# Patient Record
Sex: FEMALE | Race: WHITE | NOT HISPANIC OR LATINO | URBAN - METROPOLITAN AREA
[De-identification: names, ages, dates, MRNs, and addresses within clinical notes are randomized per-mention and may not be internally consistent; named-entity substitution may affect disease eponyms.]

---

## 2024-09-12 VITALS
WEIGHT: 169.98 LBS | HEIGHT: 64 IN | TEMPERATURE: 97 F | SYSTOLIC BLOOD PRESSURE: 122 MMHG | DIASTOLIC BLOOD PRESSURE: 74 MMHG | OXYGEN SATURATION: 99 % | RESPIRATION RATE: 16 BRPM | HEART RATE: 61 BPM

## 2024-09-12 LAB
ANION GAP SERPL CALC-SCNC: 10 MMOL/L — SIGNIFICANT CHANGE UP (ref 5–17)
BUN SERPL-MCNC: 22 MG/DL — SIGNIFICANT CHANGE UP (ref 7–23)
CALCIUM SERPL-MCNC: 9.4 MG/DL — SIGNIFICANT CHANGE UP (ref 8.4–10.5)
CHLORIDE SERPL-SCNC: 104 MMOL/L — SIGNIFICANT CHANGE UP (ref 96–108)
CO2 SERPL-SCNC: 26 MMOL/L — SIGNIFICANT CHANGE UP (ref 22–31)
CREAT SERPL-MCNC: 0.99 MG/DL — SIGNIFICANT CHANGE UP (ref 0.5–1.3)
EGFR: 58 ML/MIN/1.73M2 — LOW
GLUCOSE SERPL-MCNC: 104 MG/DL — HIGH (ref 70–99)
HCT VFR BLD CALC: 38.8 % — SIGNIFICANT CHANGE UP (ref 34.5–45)
HGB BLD-MCNC: 12.4 G/DL — SIGNIFICANT CHANGE UP (ref 11.5–15.5)
MCHC RBC-ENTMCNC: 29.2 PG — SIGNIFICANT CHANGE UP (ref 27–34)
MCHC RBC-ENTMCNC: 32 GM/DL — SIGNIFICANT CHANGE UP (ref 32–36)
MCV RBC AUTO: 91.5 FL — SIGNIFICANT CHANGE UP (ref 80–100)
NRBC # BLD: 0 /100 WBCS — SIGNIFICANT CHANGE UP (ref 0–0)
PLATELET # BLD AUTO: 340 K/UL — SIGNIFICANT CHANGE UP (ref 150–400)
POTASSIUM SERPL-MCNC: 3.9 MMOL/L — SIGNIFICANT CHANGE UP (ref 3.5–5.3)
POTASSIUM SERPL-SCNC: 3.9 MMOL/L — SIGNIFICANT CHANGE UP (ref 3.5–5.3)
RBC # BLD: 4.24 M/UL — SIGNIFICANT CHANGE UP (ref 3.8–5.2)
RBC # FLD: 14.4 % — SIGNIFICANT CHANGE UP (ref 10.3–14.5)
SODIUM SERPL-SCNC: 140 MMOL/L — SIGNIFICANT CHANGE UP (ref 135–145)
WBC # BLD: 6.34 K/UL — SIGNIFICANT CHANGE UP (ref 3.8–10.5)
WBC # FLD AUTO: 6.34 K/UL — SIGNIFICANT CHANGE UP (ref 3.8–10.5)

## 2024-09-12 PROCEDURE — 99285 EMERGENCY DEPT VISIT HI MDM: CPT

## 2024-09-12 PROCEDURE — 71045 X-RAY EXAM CHEST 1 VIEW: CPT | Mod: 26

## 2024-09-12 PROCEDURE — 99053 MED SERV 10PM-8AM 24 HR FAC: CPT

## 2024-09-12 RX ORDER — SODIUM CHLORIDE 9 MG/ML
500 INJECTION INTRAMUSCULAR; INTRAVENOUS; SUBCUTANEOUS ONCE
Refills: 0 | Status: COMPLETED | OUTPATIENT
Start: 2024-09-12 | End: 2024-09-12
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
## 2024-09-12 NOTE — ED ADULT NURSE NOTE - NSFALLRISKINTERV_ED_ALL_ED
Assistance OOB with selected safe patient handling equipment if applicable/Assistance with ambulation/Communicate fall risk and risk factors to all staff, patient, and family/Monitor gait and stability/Monitor for mental status changes and reorient to person, place, and time, as needed/Provide visual cue: yellow wristband, yellow gown, etc/Reinforce activity limits and safety measures with patient and family/Toileting schedule using arm’s reach rule for commode and bathroom/Use of alarms - bed, stretcher, chair and/or video monitoring/Call bell, personal items and telephone in reach/Instruct patient to call for assistance before getting out of bed/chair/stretcher/Non-slip footwear applied when patient is off stretcher/[place] to call system/Physically safe environment - no spills, clutter or unnecessary equipment/Purposeful Proactive Rounding/Room/bathroom lighting operational, light cord in reach
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
## 2024-09-12 NOTE — ED ADULT TRIAGE NOTE - BEFAST ARM NUMBNESS
Urology Urology Anesthesia Hospitalist Urology Urology Urology Urology Hospitalist Hospitalist Hospitalist No
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
## 2024-09-13 ENCOUNTER — INPATIENT (INPATIENT)
Facility: HOSPITAL | Age: 79
LOS: 3 days | Discharge: HOME | End: 2024-09-17
Attending: INTERNAL MEDICINE | Admitting: INTERNAL MEDICINE
Payer: SELF-PAY

## 2024-09-13 DIAGNOSIS — R33.9 RETENTION OF URINE, UNSPECIFIED: ICD-10-CM

## 2024-09-13 DIAGNOSIS — I25.10 ATHEROSCLEROTIC HEART DISEASE OF NATIVE CORONARY ARTERY WITHOUT ANGINA PECTORIS: ICD-10-CM

## 2024-09-13 DIAGNOSIS — G93.41 METABOLIC ENCEPHALOPATHY: ICD-10-CM

## 2024-09-13 DIAGNOSIS — G30.9 ALZHEIMER'S DISEASE, UNSPECIFIED: ICD-10-CM

## 2024-09-13 DIAGNOSIS — Z29.9 ENCOUNTER FOR PROPHYLACTIC MEASURES, UNSPECIFIED: ICD-10-CM

## 2024-09-13 DIAGNOSIS — E03.9 HYPOTHYROIDISM, UNSPECIFIED: ICD-10-CM

## 2024-09-13 DIAGNOSIS — M54.30 SCIATICA, UNSPECIFIED SIDE: ICD-10-CM

## 2024-09-13 DIAGNOSIS — E78.5 HYPERLIPIDEMIA, UNSPECIFIED: ICD-10-CM

## 2024-09-13 LAB
ANION GAP SERPL CALC-SCNC: 8 MMOL/L — SIGNIFICANT CHANGE UP (ref 5–17)
APPEARANCE UR: CLEAR — SIGNIFICANT CHANGE UP
BACTERIA # UR AUTO: NEGATIVE /HPF — SIGNIFICANT CHANGE UP
BASOPHILS # BLD AUTO: 0.03 K/UL — SIGNIFICANT CHANGE UP (ref 0–0.2)
BASOPHILS NFR BLD AUTO: 0.6 % — SIGNIFICANT CHANGE UP (ref 0–2)
BILIRUB UR-MCNC: NEGATIVE — SIGNIFICANT CHANGE UP
BUN SERPL-MCNC: 17 MG/DL — SIGNIFICANT CHANGE UP (ref 7–23)
CALCIUM SERPL-MCNC: 8.9 MG/DL — SIGNIFICANT CHANGE UP (ref 8.4–10.5)
CHLORIDE SERPL-SCNC: 109 MMOL/L — HIGH (ref 96–108)
CO2 SERPL-SCNC: 28 MMOL/L — SIGNIFICANT CHANGE UP (ref 22–31)
COLOR SPEC: YELLOW — SIGNIFICANT CHANGE UP
CREAT SERPL-MCNC: 0.99 MG/DL — SIGNIFICANT CHANGE UP (ref 0.5–1.3)
DIFF PNL FLD: NEGATIVE — SIGNIFICANT CHANGE UP
EGFR: 58 ML/MIN/1.73M2 — LOW
EOSINOPHIL # BLD AUTO: 0.06 K/UL — SIGNIFICANT CHANGE UP (ref 0–0.5)
EOSINOPHIL NFR BLD AUTO: 1.2 % — SIGNIFICANT CHANGE UP (ref 0–6)
FOLATE SERPL-MCNC: 14.9 NG/ML — SIGNIFICANT CHANGE UP
GLUCOSE SERPL-MCNC: 89 MG/DL — SIGNIFICANT CHANGE UP (ref 70–99)
GLUCOSE UR QL: NEGATIVE MG/DL — SIGNIFICANT CHANGE UP
HCT VFR BLD CALC: 34.9 % — SIGNIFICANT CHANGE UP (ref 34.5–45)
HGB BLD-MCNC: 11.2 G/DL — LOW (ref 11.5–15.5)
IMM GRANULOCYTES NFR BLD AUTO: 0.4 % — SIGNIFICANT CHANGE UP (ref 0–0.9)
KETONES UR-MCNC: NEGATIVE MG/DL — SIGNIFICANT CHANGE UP
LEUKOCYTE ESTERASE UR-ACNC: ABNORMAL
LYMPHOCYTES # BLD AUTO: 1.36 K/UL — SIGNIFICANT CHANGE UP (ref 1–3.3)
LYMPHOCYTES # BLD AUTO: 26.7 % — SIGNIFICANT CHANGE UP (ref 13–44)
MAGNESIUM SERPL-MCNC: 2.4 MG/DL — SIGNIFICANT CHANGE UP (ref 1.6–2.6)
MCHC RBC-ENTMCNC: 29.2 PG — SIGNIFICANT CHANGE UP (ref 27–34)
MCHC RBC-ENTMCNC: 32.1 GM/DL — SIGNIFICANT CHANGE UP (ref 32–36)
MCV RBC AUTO: 91.1 FL — SIGNIFICANT CHANGE UP (ref 80–100)
MONOCYTES # BLD AUTO: 0.57 K/UL — SIGNIFICANT CHANGE UP (ref 0–0.9)
MONOCYTES NFR BLD AUTO: 11.2 % — SIGNIFICANT CHANGE UP (ref 2–14)
NEUTROPHILS # BLD AUTO: 3.06 K/UL — SIGNIFICANT CHANGE UP (ref 1.8–7.4)
NEUTROPHILS NFR BLD AUTO: 59.9 % — SIGNIFICANT CHANGE UP (ref 43–77)
NITRITE UR-MCNC: NEGATIVE — SIGNIFICANT CHANGE UP
NRBC # BLD: 0 /100 WBCS — SIGNIFICANT CHANGE UP (ref 0–0)
PH UR: 5.5 — SIGNIFICANT CHANGE UP (ref 5–8)
PHOSPHATE SERPL-MCNC: 3.9 MG/DL — SIGNIFICANT CHANGE UP (ref 2.5–4.5)
PLATELET # BLD AUTO: 249 K/UL — SIGNIFICANT CHANGE UP (ref 150–400)
POTASSIUM SERPL-MCNC: 3.9 MMOL/L — SIGNIFICANT CHANGE UP (ref 3.5–5.3)
POTASSIUM SERPL-SCNC: 3.9 MMOL/L — SIGNIFICANT CHANGE UP (ref 3.5–5.3)
PROT UR-MCNC: NEGATIVE MG/DL — SIGNIFICANT CHANGE UP
RAPID RVP RESULT: SIGNIFICANT CHANGE UP
RBC # BLD: 3.83 M/UL — SIGNIFICANT CHANGE UP (ref 3.8–5.2)
RBC # FLD: 14.3 % — SIGNIFICANT CHANGE UP (ref 10.3–14.5)
RBC CASTS # UR COMP ASSIST: 1 /HPF — SIGNIFICANT CHANGE UP (ref 0–4)
SARS-COV-2 RNA SPEC QL NAA+PROBE: SIGNIFICANT CHANGE UP
SODIUM SERPL-SCNC: 145 MMOL/L — SIGNIFICANT CHANGE UP (ref 135–145)
SP GR SPEC: 1.01 — SIGNIFICANT CHANGE UP (ref 1–1.03)
SQUAMOUS # UR AUTO: 5 /HPF — SIGNIFICANT CHANGE UP (ref 0–5)
T PALLIDUM AB TITR SER: NEGATIVE — SIGNIFICANT CHANGE UP
TSH SERPL-MCNC: 2.86 UIU/ML — SIGNIFICANT CHANGE UP (ref 0.27–4.2)
UROBILINOGEN FLD QL: 0.2 MG/DL — SIGNIFICANT CHANGE UP (ref 0.2–1)
VIT B12 SERPL-MCNC: 665 PG/ML — SIGNIFICANT CHANGE UP (ref 232–1245)
WBC # BLD: 5.1 K/UL — SIGNIFICANT CHANGE UP (ref 3.8–10.5)
WBC # FLD AUTO: 5.1 K/UL — SIGNIFICANT CHANGE UP (ref 3.8–10.5)
WBC UR QL: 18 /HPF — HIGH (ref 0–5)

## 2024-09-13 PROCEDURE — 99236 HOSP IP/OBS SAME DATE HI 85: CPT

## 2024-09-13 PROCEDURE — 93010 ELECTROCARDIOGRAM REPORT: CPT

## 2024-09-13 PROCEDURE — 99223 1ST HOSP IP/OBS HIGH 75: CPT

## 2024-09-13 PROCEDURE — 70450 CT HEAD/BRAIN W/O DYE: CPT | Mod: 26,MC

## 2024-09-13 RX ORDER — ACETAMINOPHEN 325 MG/1
650 TABLET ORAL EVERY 6 HOURS
Refills: 0 | Status: DISCONTINUED | OUTPATIENT
Start: 2024-09-13 | End: 2024-09-17

## 2024-09-13 RX ORDER — CARIPRAZINE 4.5 MG/1
1 CAPSULE, GELATIN COATED ORAL
Refills: 0 | DISCHARGE

## 2024-09-13 RX ORDER — OLANZAPINE 7.5 MG/1
5 TABLET ORAL ONCE
Refills: 0 | Status: COMPLETED | OUTPATIENT
Start: 2024-09-13 | End: 2024-09-13

## 2024-09-13 RX ORDER — RISPERIDONE 0.25 MG/1
0.25 TABLET, FILM COATED ORAL EVERY 24 HOURS
Refills: 0 | Status: DISCONTINUED | OUTPATIENT
Start: 2024-09-13 | End: 2024-09-13

## 2024-09-13 RX ORDER — RISPERIDONE 0.25 MG/1
0.5 TABLET, FILM COATED ORAL AT BEDTIME
Refills: 0 | Status: DISCONTINUED | OUTPATIENT
Start: 2024-09-13 | End: 2024-09-13

## 2024-09-13 RX ORDER — MEMANTINE 7 MG/1
10 CAPSULE, EXTENDED RELEASE ORAL EVERY 12 HOURS
Refills: 0 | Status: DISCONTINUED | OUTPATIENT
Start: 2024-09-13 | End: 2024-09-16

## 2024-09-13 RX ORDER — TRAZODONE HCL 50 MG
100 TABLET ORAL ONCE
Refills: 0 | Status: COMPLETED | OUTPATIENT
Start: 2024-09-13 | End: 2024-09-13

## 2024-09-13 RX ORDER — FUROSEMIDE 40 MG
1 TABLET ORAL
Refills: 0 | DISCHARGE

## 2024-09-13 RX ORDER — POLYETHYLENE GLYCOL 3350 17 G/17G
17 POWDER, FOR SOLUTION ORAL EVERY 12 HOURS
Refills: 0 | Status: DISCONTINUED | OUTPATIENT
Start: 2024-09-13 | End: 2024-09-17

## 2024-09-13 RX ORDER — RIVASTIGMINE TARTRATE 1.5 MG/1
1 CAPSULE ORAL
Refills: 0 | DISCHARGE

## 2024-09-13 RX ORDER — OLANZAPINE 7.5 MG/1
5 TABLET ORAL ONCE
Refills: 0 | Status: DISCONTINUED | OUTPATIENT
Start: 2024-09-13 | End: 2024-09-13

## 2024-09-13 RX ORDER — ONDANSETRON 2 MG/ML
4 INJECTION, SOLUTION INTRAMUSCULAR; INTRAVENOUS EVERY 8 HOURS
Refills: 0 | Status: DISCONTINUED | OUTPATIENT
Start: 2024-09-13 | End: 2024-09-17

## 2024-09-13 RX ORDER — SENNA 187 MG
2 TABLET ORAL
Refills: 0 | Status: DISCONTINUED | OUTPATIENT
Start: 2024-09-13 | End: 2024-09-17

## 2024-09-13 RX ORDER — MEMANTINE 7 MG/1
10 CAPSULE, EXTENDED RELEASE ORAL DAILY
Refills: 0 | Status: DISCONTINUED | OUTPATIENT
Start: 2024-09-13 | End: 2024-09-13

## 2024-09-13 RX ORDER — OLANZAPINE 7.5 MG/1
2.5 TABLET ORAL
Qty: 0 | Refills: 0 | DISCHARGE
Start: 2024-09-13

## 2024-09-13 RX ORDER — ENOXAPARIN SODIUM 100 MG/ML
40 INJECTION SUBCUTANEOUS EVERY 24 HOURS
Refills: 0 | Status: DISCONTINUED | OUTPATIENT
Start: 2024-09-13 | End: 2024-09-17

## 2024-09-13 RX ORDER — RIVASTIGMINE TARTRATE 1.5 MG/1
1 CAPSULE ORAL EVERY 24 HOURS
Refills: 0 | Status: DISCONTINUED | OUTPATIENT
Start: 2024-09-13 | End: 2024-09-17

## 2024-09-13 RX ORDER — RISPERIDONE 0.25 MG/1
0.25 TABLET, FILM COATED ORAL ONCE
Refills: 0 | Status: COMPLETED | OUTPATIENT
Start: 2024-09-13 | End: 2024-09-13

## 2024-09-13 RX ORDER — OLANZAPINE 7.5 MG/1
2.5 TABLET ORAL EVERY 8 HOURS
Refills: 0 | Status: DISCONTINUED | OUTPATIENT
Start: 2024-09-13 | End: 2024-09-13

## 2024-09-13 RX ORDER — RISPERIDONE 0.25 MG/1
1 TABLET, FILM COATED ORAL
Qty: 0 | Refills: 0 | DISCHARGE
Start: 2024-09-13

## 2024-09-13 RX ORDER — OLANZAPINE 7.5 MG/1
2.5 TABLET ORAL EVERY 8 HOURS
Refills: 0 | Status: DISCONTINUED | OUTPATIENT
Start: 2024-09-13 | End: 2024-09-17

## 2024-09-13 RX ORDER — TRAZODONE HCL 50 MG
50 TABLET ORAL AT BEDTIME
Refills: 0 | Status: DISCONTINUED | OUTPATIENT
Start: 2024-09-13 | End: 2024-09-17

## 2024-09-13 RX ADMIN — ACETAMINOPHEN 650 MILLIGRAM(S): 325 TABLET ORAL at 23:16

## 2024-09-13 RX ADMIN — MEMANTINE 10 MILLIGRAM(S): 7 CAPSULE, EXTENDED RELEASE ORAL at 22:07

## 2024-09-13 RX ADMIN — ACETAMINOPHEN 650 MILLIGRAM(S): 325 TABLET ORAL at 22:16

## 2024-09-13 RX ADMIN — SODIUM CHLORIDE 500 MILLILITER(S): 9 INJECTION INTRAMUSCULAR; INTRAVENOUS; SUBCUTANEOUS at 00:04

## 2024-09-13 RX ADMIN — MEMANTINE 10 MILLIGRAM(S): 7 CAPSULE, EXTENDED RELEASE ORAL at 11:22

## 2024-09-13 RX ADMIN — POLYETHYLENE GLYCOL 3350 17 GRAM(S): 17 POWDER, FOR SOLUTION ORAL at 22:08

## 2024-09-13 RX ADMIN — Medication 10 MILLIGRAM(S): at 11:23

## 2024-09-13 RX ADMIN — Medication 100 MILLIGRAM(S): at 11:22

## 2024-09-13 RX ADMIN — RISPERIDONE 0.25 MILLIGRAM(S): 0.25 TABLET, FILM COATED ORAL at 06:47

## 2024-09-13 RX ADMIN — Medication 50 MILLIGRAM(S): at 22:07

## 2024-09-13 RX ADMIN — POLYETHYLENE GLYCOL 3350 17 GRAM(S): 17 POWDER, FOR SOLUTION ORAL at 11:22

## 2024-09-13 RX ADMIN — Medication 2 TABLET(S): at 11:22

## 2024-09-13 RX ADMIN — ENOXAPARIN SODIUM 40 MILLIGRAM(S): 100 INJECTION SUBCUTANEOUS at 11:22

## 2024-09-13 RX ADMIN — OLANZAPINE 5 MILLIGRAM(S): 7.5 TABLET ORAL at 02:50

## 2024-09-13 RX ADMIN — Medication 2 TABLET(S): at 22:07

## 2024-09-13 NOTE — H&P ADULT - PROBLEM SELECTOR PLAN 6
F: s/p 500cc NS in ED  E: replete prn  D: pending bedside dysphagia  GI: none  DVT: lovenox  Code: DNR/DNI per outpt records - call NH/ to confirm in AM  Dispo: [facility] F: s/p 500cc NS in ED  E: replete prn  D: pending bedside dysphagia  GI: none  DVT: lovenox  Code: DNR/DNI    Dispo: [facility]

## 2024-09-13 NOTE — H&P ADULT - PROBLEM SELECTOR PLAN 4
Per chart review, pt w hx of hypothyroidism, but does not appear to currently be prescribed any meds  - f/u TSH, free T4  - formal med rec # HLD  Per outpt chart review, pt noted to have hx of CAD, not documented whether pt has history of MI, stents, etc.   - c/w atorvastatin 20mg po qhs # HLD  Per outpt chart review, pt noted to have hx of CAD, not documented whether pt has history of MI, stents, etc.   - c/w atorvastatin 20mg po qhs  - home med: lasix 20mg daily (on forms sent from NH, formal med rec in AM)
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
## 2024-09-13 NOTE — H&P ADULT - ASSESSMENT
78 yo F w PMH of dementia, recurrent UTIs, intermittent urinary retention, BIBEMS from NH for behavior issues, aggression. 80 yo F w PMH of dementia, recurrent UTIs, intermittent urinary retention, BIBEMS from NH for behavior issues, aggression, admitted for metabolic encephalpathy abd behavioral disturbance w/u.  78 yo F w PMH of dementia, recurrent UTIs, intermittent urinary retention, BIBEMS from NH for behavior issues, aggression, admitted for metabolic encephalopathy abd behavioral disturbance w/u.

## 2024-09-13 NOTE — H&P ADULT - PROBLEM SELECTOR PLAN 2
AAOx1 at baseline  - see above Home AAOx1 at baseline  - c/w home memantine 28mg po daily  - c/w home rivastigmine 9.5mg/24h patch daily  - see above AAOx1 at baseline  - c/w home memantine 10mg po daily  - see above

## 2024-09-13 NOTE — BH CONSULTATION LIAISON ASSESSMENT NOTE - HPI (INCLUDE ILLNESS QUALITY, SEVERITY, DURATION, TIMING, CONTEXT, MODIFYING FACTORS, ASSOCIATED SIGNS AND SYMPTOMS)
The patient is a 79 year old female with PMH of Alzheimer's disease, recurrent UTIs, intermittent urinary retention, fibromyalgia, hypothyroidism, CAD who presented to the emergency room for worsening behavioral disturbance at assisted living facility. The patient has psychiatric history of major neurocognitive disorder due to alzheimer's disease with behavioral disturbance with no past psychiatric hospitalizations. The patient was found to have retained 800 cc of urine in the emergency room and was subsequently admitted for further evaluation. Her urinalysis returned with  The patient is a 79 year old female with PMH of Alzheimer's disease, recurrent UTIs, intermittent urinary retention, fibromyalgia, hypothyroidism, CAD who presented to the emergency room for worsening behavioral disturbance at assisted living facility. The patient has psychiatric history of major neurocognitive disorder due to alzheimer's disease with behavioral disturbance with no past psychiatric hospitalizations. The patient was found to have retained 800 cc of urine in the emergency room and was subsequently admitted for further evaluation. She required zyprexa 5 mg IM q once in the ER due to agitated behavior. Her urinalysis returned with wbc 18 and small leukocyte esterase and she was initiated on ceftriaxone 1000 mg IV q24 hours for three days to treat UTI. Psychiatry was consulted for medication management. The patient's  was at bedside, who confirmed medication regimen from psychiatrist at the assisted living facility of vraylar 1.5 mg po daily, trazodone 50 mg po qhs, memantine ER 28 mg po daily, and rivastigmine 9.5 mg transdermal patch per 24 hours. The patient's  endorses the patient has behavioral disturbances when she has a urinary tract infection, however otherwise her behavior is well controlled with the aforementioned medication regimen. He endorses she moved into the assisted living facility in march/april of 2024 due to behavioral disturbances at home in the context of her alzheimer's disease and was subsequently initiated on the current medication regimen. Throughout evaluation, the patient is minimally response however pleasant. She is AA0X1 (person, not place or time) which is her baseline. She has been behaviorally controlled overnight post receiving the intramuscular zyprexa. She denies depressive symptoms and no manic or psychotic symptoms appear throughout evaluation. She denies suicidal or homicidal ideation, intent, or plan. No perceptual disturbances elicited.  The patient is a 79 year old female with PMH of Alzheimer's disease, recurrent UTIs, intermittent urinary retention, fibromyalgia, hypothyroidism, CAD who presented to the emergency room for worsening behavioral disturbance at assisted living facility. The patient has psychiatric history of major neurocognitive disorder due to alzheimer's disease with behavioral disturbance with no past psychiatric hospitalizations. The patient was found to have retained 800 cc of urine in the emergency room and was subsequently admitted for further evaluation. She required zyprexa 5 mg IM q once in the ER due to agitated behavior. Her urinalysis returned with wbc 18 and small leukocyte esterase and she was initiated on ceftriaxone 1000 mg IV q24 hours for three days to treat UTI. Psychiatry was consulted for medication management. The patient's  was at bedside, who confirmed medication regimen from psychiatrist at the assisted living facility of Vraylar 1.5 mg po daily, trazodone 50 mg po qhs, memantine ER 28 mg po daily, and rivastigmine 9.5 mg transdermal patch per 24 hours. The patient's  endorses the patient has behavioral disturbances when she has a urinary tract infection, however otherwise her behavior is well controlled with the aforementioned medication regimen. He endorses she moved into the assisted living facility in march/april of 2024 due to behavioral disturbances at home in the context of her alzheimer's disease and was subsequently initiated on the current medication regimen. Throughout evaluation, the patient is minimally response however pleasant. She is AA0X1 (person, not place or time) which is her baseline. She has been behaviorally controlled overnight post receiving the intramuscular zyprexa. She denies depressive symptoms and no manic or psychotic symptoms appear throughout evaluation. She denies suicidal or homicidal ideation, intent, or plan. No perceptual disturbances elicited.

## 2024-09-13 NOTE — ED PROVIDER NOTE - NSFOLLOWUPINSTRUCTIONS_ED_ALL_ED_FT
Follow up with your primary medical doctor as soon as possible.    Return to the emergency department if your symptoms worsen or if you develop new symptoms.  If you have any problems with followup, please call the ED Referral Coordinator at [phone].    Urinary Tract Infection    A urinary tract infection (UTI) is an infection of any part of the urinary tract, which includes the kidneys, ureters, bladder, and urethra. Risk factors include ignoring your need to urinate, wiping back to front if female, being an uncircumcised male, and having diabetes or a weak immune system. Symptoms include frequent urination, pain or burning with urination, foul smelling urine, cloudy urine, pain in the lower abdomen, blood in the urine, and fever. If you were prescribed an antibiotic medicine, take it as told by your health care provider. Do not stop taking the antibiotic even if you start to feel better.    SEEK IMMEDIATE MEDICAL CARE IF YOU HAVE ANY OF THE FOLLOWING SYMPTOMS: severe back or abdominal pain, fever, inability to keep fluids or medicine down, dizziness/lightheadedness, or a change in mental status. Follow up with your primary medical doctor as soon as possible.    Return to the emergency department if your symptoms worsen or if you develop new symptoms.    Per prior psychiatry evaluation by Dr. Alban Burnham (psychiatrist) on 3/22/24, continue recommend medication:    -recommend to START risperidone 0.25 mg po at noon  -recommend to continue risperidone 0.25 mg po daily and 0.5 mg po @ 18:00  -recommend to continue melatonin to 6 mg po @ 18:00 for sleep  -recommend to continue trazodone to 100 mg po @ 18:00 for sleep    Recommend having patient f/u with psychiatrist for further management of behavioral concerns and medication management.
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
## 2024-09-13 NOTE — DISCHARGE NOTE PROVIDER - NSDCMRMEDTOKEN_GEN_ALL_CORE_FT
atorvastatin 20 mg oral tablet: 1 tab(s) orally once a day  furosemide 20 mg oral tablet: 1 tab(s) orally once a day  memantine 28 mg oral capsule, extended release: 1 cap(s) orally once a day  rivastigmine 9.5 mg/24 hr transdermal film, extended release: 1 patch transdermally once a day  traZODone 50 mg oral tablet: orally 2 times a day  Vraylar 1.5 mg oral capsule: 1 cap(s) orally once a day   atorvastatin 20 mg oral tablet: 1 tab(s) orally once a day  cefpodoxime 100 mg oral tablet: 1 tab(s) orally 2 times a day  furosemide 20 mg oral tablet: 1 tab(s) orally once a day  memantine 28 mg oral capsule, extended release: 1 cap(s) orally once a day  rivastigmine 9.5 mg/24 hr transdermal film, extended release: 1 patch transdermally once a day  traZODone 50 mg oral tablet: orally 2 times a day  Vraylar 1.5 mg oral capsule: 1 cap(s) orally once a day   atorvastatin 20 mg oral tablet: 1 tab(s) orally once a day  cefpodoxime 100 mg oral tablet: 1 tab(s) orally 2 times a day  furosemide 20 mg oral tablet: 1 tab(s) orally once a day  melatonin 3 mg oral tablet: 1 tab(s) orally once a day (at bedtime) As needed Insomnia  memantine 28 mg oral capsule, extended release: 1 cap(s) orally once a day  risperiDONE 0.25 mg oral tablet: 1 tab(s) orally every 24 hours  risperiDONE 0.5 mg oral tablet: 1 tab(s) orally once a day (at bedtime)  rivastigmine 9.5 mg/24 hr transdermal film, extended release: 1 patch transdermally once a day  traZODone 50 mg oral tablet: orally 2 times a day  Vraylar 1.5 mg oral capsule: 1 cap(s) orally once a day   atorvastatin 20 mg oral tablet: 1 tab(s) orally once a day  furosemide 20 mg oral tablet: 1 tab(s) orally once a day  melatonin 3 mg oral tablet: 1 tab(s) orally once a day (at bedtime) As needed Insomnia  memantine 28 mg oral capsule, extended release: 1 cap(s) orally once a day  OLANZapine 10 mg intramuscular injection: 2.5 milligram(s) intramuscular every 8 hours As needed agitation, aggressive  rivastigmine 9.5 mg/24 hr transdermal film, extended release: 1 patch transdermally once a day  traZODone 50 mg oral tablet: 1 cap(s) orally once a day (at bedtime)  Vraylar 1.5 mg oral capsule: 1 cap(s) orally once a day   atorvastatin 20 mg oral tablet: 1 tab(s) orally once a day  furosemide 20 mg oral tablet: 1 tab(s) orally once a day  melatonin 3 mg oral tablet: 1 tab(s) orally once a day (at bedtime) As needed Insomnia  OLANZapine 10 mg intramuscular injection: 2.5 milligram(s) intramuscular every 8 hours As needed agitation, aggressive  rivastigmine 9.5 mg/24 hr transdermal film, extended release: 1 patch transdermally once a day  traZODone 50 mg oral tablet: 1 cap(s) orally once a day (at bedtime)  Vraylar 1.5 mg oral capsule: 1 cap(s) orally once a day   atorvastatin 20 mg oral tablet: 1 tab(s) orally once a day  ClearLax oral powder for reconstitution: 17 gram(s) orally every 12 hours  furosemide 20 mg oral tablet: 1 tab(s) orally once a day  Melatonin 3 mg oral tablet: 1 tab(s) orally once a day (at bedtime) as needed for Insomnia  OLANZapine 2.5 mg oral tablet: 1 tab(s) orally every 8 hours as needed for  agitation  rivastigmine 9.5 mg/24 hr transdermal film, extended release: 1 patch transdermally once a day  senna leaf extract oral tablet: 2 tab(s) orally 2 times a day  traZODone 50 mg oral tablet: 1 cap(s) orally once a day (at bedtime)  Tylenol 325 mg oral tablet: 2 tab(s) orally every 6 hours as needed for Temp greater or equal to 38C (100.4F), Mild Pain (1 - 3)  Vraylar 1.5 mg oral capsule: 1 cap(s) orally once a day   acetaminophen 500 mg oral tablet: 2 tab(s) orally 3 times a day as needed for  severe pain  atorvastatin 20 mg oral tablet: 1 tab(s) orally once a day  ClearLax oral powder for reconstitution: 17 gram(s) orally once a day  furosemide 20 mg oral tablet: 1 tab(s) orally once a day  Melatonin 3 mg oral tablet: 2 tab(s) orally once a day (at bedtime) as needed for Insomnia  Melatonin 3 mg oral tablet: 1 tab(s) orally once a day (at bedtime) as needed for Insomnia  OLANZapine 2.5 mg oral tablet: 1 tab(s) orally every 8 hours as needed for  agitation  rivastigmine 9.5 mg/24 hr transdermal film, extended release: 1 patch transdermally once a day  senna leaf extract oral tablet: 2 tab(s) orally 2 times a day  traZODone 50 mg oral tablet: 1 cap(s) orally once a day (at bedtime)  Vraylar 1.5 mg oral capsule: 1 cap(s) orally once a day

## 2024-09-13 NOTE — PATIENT PROFILE ADULT - FALL HARM RISK - HARM RISK INTERVENTIONS
Assistance with ambulation/Assistance OOB with selected safe patient handling equipment/Communicate Risk of Fall with Harm to all staff/Monitor for mental status changes/Move patient closer to nurses' station/Reinforce activity limits and safety measures with patient and family/Reorient to person, place and time as needed/Tailored Fall Risk Interventions/Toileting schedule using arm’s reach rule for commode and bathroom/Use of alarms - bed, chair and/or voice tab/Visual Cue: Yellow wristband and red socks/Bed in lowest position, wheels locked, appropriate side rails in place/Call bell, personal items and telephone in reach/Instruct patient to call for assistance before getting out of bed or chair/Non-slip footwear when patient is out of bed/[place] to call system/Physically safe environment - no spills, clutter or unnecessary equipment/Purposeful Proactive Rounding/Room/bathroom lighting operational, light cord in reach

## 2024-09-13 NOTE — H&P ADULT - PROBLEM SELECTOR PLAN 5
F: s/p 500cc NS in ED  E: replete prn  D: pending bedside dysphagia  GI: none  DVT: lovenox  Code: DNR/DNI per outpt records - call NH/ to confirm in AM  Dispo: [facility] Per chart review, pt w hx of hypothyroidism, but does not appear to currently be prescribed any meds  - f/u TSH, free T4  - formal med rec

## 2024-09-13 NOTE — H&P ADULT - NSHPLABSRESULTS_GEN_ALL_CORE
.  LABS:                         12.4   6.34  )-----------( 340      ( 12 Sep 2024 23:16 )             38.8     09-12    140  |  104  |  22  ----------------------------<  104<H>  3.9   |  26  |  0.99    Ca    9.4      12 Sep 2024 23:16        Urinalysis Basic - ( 12 Sep 2024 23:16 )    Color: x / Appearance: x / SG: x / pH: x  Gluc: 104 mg/dL / Ketone: x  / Bili: x / Urobili: x   Blood: x / Protein: x / Nitrite: x   Leuk Esterase: x / RBC: x / WBC x   Sq Epi: x / Non Sq Epi: x / Bacteria: x      RADIOLOGY, EKG & ADDITIONAL TESTS: Reviewed.   < from: CT Head No Cont (09.13.24 @ 00:51) >    IMPRESSION:  No acute intracranial abnormality.    < end of copied text >

## 2024-09-13 NOTE — ED PROVIDER NOTE - PHYSICAL EXAMINATION
CONSTITUTIONAL: Non-toxic; in no apparent distress, AAOx1, NAD, elderly, frail  HEAD: Normocephalic; atraumatic  EYES: PERRL; EOM intact   ENMT: External appears normal  NECK: Supple; non-tender  CARD: Normal S1, S2; no murmurs, rubs, or gallops  RESP: Normal chest excursion with respiration; breath sounds clear and equal bilaterally  ABD: Soft, non-distended; non-tender  EXT: Normal ROM in all four extremities; non-tender to palpation, no peripheral edema  SKIN: Warm, dry, no rash  NEURO:  No focal neurological deficiencies, CN 2-12 intact BL, no dysmetria, no pronator drift, gait normal, strength/sensation intact throughout, normal cognition

## 2024-09-13 NOTE — DISCHARGE NOTE PROVIDER - NSDCCPCAREPLAN_GEN_ALL_CORE_FT
PRINCIPAL DISCHARGE DIAGNOSIS  Diagnosis: Urinary tract infection  Assessment and Plan of Treatment: You were sent to this hospital from your nursing home out of concern for infection. You were found to be retaining urine in the emergency room, and your urine test showed signs of a urinary tract infection (UTI). You were given IV antibiotics to treat this infection and you will be discharged with oral antibiotics which you should take as prescribed. If you feel burning or pain while urinating, or if you feel increased sensation that you need to pee with little urine coming out, please see a healthcare provider. In addition, we recommend that you follow up with a urogynecologist for your recurrent UTIs.
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
## 2024-09-13 NOTE — ED PROVIDER NOTE - PROGRESS NOTE DETAILS
Pt began acting aggressively in the ED, slapping herself and others. Will medicate per recommendations on [employer]:  Per prior psychiatry evaluation by Dr. Alban Burnham (psychiatrist) on 3/22/24:  -recommend to START risperidone 0.25 mg po at noon  -recommend to continue risperidone 0.25 mg po daily and 0.5 mg po @ 18:00  -recommend to continue melatonin to 6 mg po @ 18:00 for sleep  -recommend to continue trazodone to 100 mg po @ 18:00 for sleep Straight cath for urine produced 800 cc of urine

## 2024-09-13 NOTE — ED PROVIDER NOTE - CLINICAL SUMMARY MEDICAL DECISION MAKING FREE TEXT BOX
Pt with acute onset of AMS  Will w/u for infectious vs metabolic vs intracranial cause  Pt does not appear septic on exam  Labs  CXR, CT head  UA

## 2024-09-13 NOTE — BH CONSULTATION LIAISON ASSESSMENT NOTE - NSBHCHARTREVIEWVS_PSY_A_CORE FT
Vital Signs Last 24 Hrs  T(C): 36.6 (13 Sep 2024 11:37), Max: 36.6 (13 Sep 2024 11:37)  T(F): 97.8 (13 Sep 2024 11:37), Max: 97.8 (13 Sep 2024 11:37)  HR: 65 (13 Sep 2024 11:37) (51 - 65)  BP: 97/59 (13 Sep 2024 11:37) (92/63 - 122/74)  BP(mean): --  RR: 18 (13 Sep 2024 11:37) (16 - 18)  SpO2: 96% (13 Sep 2024 11:37) (96% - 99%)    Parameters below as of 13 Sep 2024 11:37  Patient On (Oxygen Delivery Method): room air

## 2024-09-13 NOTE — BH CONSULTATION LIAISON ASSESSMENT NOTE - CURRENT MEDICATION
MEDICATIONS  (STANDING):  bisacodyl Suppository 10 milliGRAM(s) Rectal daily  cefTRIAXone   IVPB 1000 milliGRAM(s) IV Intermittent every 24 hours  enoxaparin Injectable 40 milliGRAM(s) SubCutaneous every 24 hours  memantine 10 milliGRAM(s) Oral daily  polyethylene glycol 3350 17 Gram(s) Oral every 12 hours  risperiDONE   Tablet 0.5 milliGRAM(s) Oral at bedtime  risperiDONE   Tablet 0.25 milliGRAM(s) Oral every 24 hours  senna 2 Tablet(s) Oral two times a day  traZODone 50 milliGRAM(s) Oral at bedtime    MEDICATIONS  (PRN):  acetaminophen     Tablet .. 650 milliGRAM(s) Oral every 6 hours PRN Temp greater or equal to 38C (100.4F), Mild Pain (1 - 3)  melatonin 3 milliGRAM(s) Oral at bedtime PRN Insomnia  OLANZapine Injectable 2.5 milliGRAM(s) IntraMuscular every 8 hours PRN agitation, aggressive  ondansetron Injectable 4 milliGRAM(s) IV Push every 8 hours PRN Nausea and/or Vomiting

## 2024-09-13 NOTE — H&P ADULT - PROBLEM SELECTOR PLAN 3
# HLD  Per outpt chart review, pt noted to have hx of CAD, not documented whether pt has history of MI, stents, etc.   - c/w atorvastatin 20mg po qhs Pt with hx of urinary retention on recent admission to [place] in May 2024, urinary retention 2/2 constipation. Noted to be retaining in ED > straight cath 800cc x 1.  - bladder scans q6  - miralax bid, senna bid, dulcolax suppository daily

## 2024-09-13 NOTE — H&P ADULT - NSHPPHYSICALEXAM_GEN_ALL_CORE
T(C): 36.3 (09-12-24 @ 22:13), Max: 36.3 (09-12-24 @ 22:13)  HR: 61 (09-12-24 @ 22:13) (61 - 61)  BP: 122/74 (09-12-24 @ 22:13) (122/74 - 122/74)  RR: 16 (09-12-24 @ 22:13) (16 - 16)  SpO2: 99% (09-12-24 @ 22:13) (99% - 99%)    CONSTITUTIONAL: Well groomed, no apparent distress  EYES: PERRLA and symmetric, EOMI, No conjunctival or scleral injection, non-icteric  ENMT: Oral mucosa with moist membranes. Normal dentition; no pharyngeal injection or exudates             NECK: Supple, symmetric and without tracheal deviation   RESP: No respiratory distress, no use of accessory muscles; CTA b/l, no WRR  CV: RRR, +S1S2, no MRG; no JVD; no peripheral edema  GI: Soft, NT, ND, no rebound, no guarding; no palpable masses; no hepatosplenomegaly; no hernia palpated  LYMPH: No cervical LAD or tenderness; no axillary LAD or tenderness; no inguinal LAD or tenderness  MSK: Normal gait; No digital clubbing or cyanosis; examination of the (head/neck/spine/ribs/pelvis, RUE, LUE, RLE, LLE) without misalignment,            Normal ROM without pain, no spinal tenderness, normal muscle strength/tone  SKIN: No rashes or ulcers noted; no subcutaneous nodules or induration palpable  NEURO: CN II-XII intact; normal reflexes in upper and lower extremities, sensation intact in upper and lower extremities b/l to light touch   PSYCH: Appropriate insight/judgment; A+O x 3, mood and affect appropriate, recent/remote memory intact T(C): 36.3 (09-12-24 @ 22:13), Max: 36.3 (09-12-24 @ 22:13)  HR: 61 (09-12-24 @ 22:13) (61 - 61)  BP: 122/74 (09-12-24 @ 22:13) (122/74 - 122/74)  RR: 16 (09-12-24 @ 22:13) (16 - 16)  SpO2: 99% (09-12-24 @ 22:13) (99% - 99%)    CONSTITUTIONAL: Well groomed, no apparent distress  EYES: PERRLA and symmetric, EOMI, No conjunctival or scleral injection, non-icteric  ENMT: Oral mucosa with moist membranes. Normal dentition; no pharyngeal injection or exudates  RESP: No respiratory distress, no use of accessory muscles; CTA b/l, no WRR  CV: RRR, +S1S2, no MRG; no JVD; no peripheral edema  GI: Soft, NT, ND, no rebound, no guarding; no palpable masses; no hepatosplenomegaly; no hernia palpated  MSK: Normal ROM without pain, no spinal tenderness, normal muscle strength/tone  SKIN: No rashes or ulcers noted; no subcutaneous nodules or induration palpable  NEURO: CN II-XII intact; normal reflexes in upper and lower extremities, sensation intact in upper and lower extremities b/l to light touch   PSYCH: Appropriate insight/judgment; A+O x 3, mood and affect appropriate, recent/remote memory intact T(C): 36.3 (09-12-24 @ 22:13), Max: 36.3 (09-12-24 @ 22:13)  HR: 61 (09-12-24 @ 22:13) (61 - 61)  BP: 122/74 (09-12-24 @ 22:13) (122/74 - 122/74)  RR: 16 (09-12-24 @ 22:13) (16 - 16)  SpO2: 99% (09-12-24 @ 22:13) (99% - 99%)    CONSTITUTIONAL: Well groomed, no apparent distress  EYES: EOMI, No conjunctival or scleral injection, non-icteric  ENMT: Oral mucosa with dry membranes. Normal dentition; no pharyngeal injection or exudates  RESP: No respiratory distress, no use of accessory muscles; CTA b/l, no WRR  CV: RRR, +S1S2, no MRG; no JVD; no peripheral edema  GI: Soft, NT, ND, no rebound, no guarding; no palpable masses; no hepatosplenomegaly; no hernia palpated  MSK: Normal ROM without pain, no spinal tenderness, normal muscle strength/tone  SKIN: No rashes or ulcers noted; no subcutaneous nodules or induration palpable  NEURO: sensation intact in upper and lower extremities b/l to light touch   PSYCH: calm, AAOx1

## 2024-09-13 NOTE — DISCHARGE NOTE PROVIDER - ATTENDING DISCHARGE PHYSICAL EXAMINATION:
General: nad, resting in bed  heent: ncat, mmm  cards: rrr, normal s1s2  pulm: ctab, no wheeze  ab: soft, ntnd    - agree with documentation above  - appreciate input from psychiatry  - medically ready for discharge  - will treat UTI  - as patient has history of recurrent UTIs, would recommend outpatient follow up with a urogynecologist    Patient was admitted and discharged on the same day    General: nad, resting in bed  heent: ncat, mmm  cards: rrr, normal s1s2  pulm: ctab, no wheeze  ab: soft, ntnd  - discontinued memantine on discharge d/t bradycardia  - as patient has history of recurrent UTIs, s.p treatment , would recommend outpatient follow up with a urogynecologist      General: nad, resting in bed  heent: ncat, mmm  cards: rrr, normal s1s2  pulm: ctab, no wheeze  ab: soft, ntnd  - discontinued memantine on discharge d/t bradycardia  - as patient has history of recurrent UTIs, dc with oral antibiotics as above  - outpatient urogynecology follow up recommended for workup of recurrent UTI

## 2024-09-13 NOTE — H&P ADULT - NSICDXPASTMEDICALHX_GEN_ALL_CORE_FT
PAST MEDICAL HISTORY:  Alzheimer dementia     CAD (coronary artery disease)     Fibromyalgia     HLD (hyperlipidemia)     Hypothyroidism     Sciatica

## 2024-09-13 NOTE — BH CONSULTATION LIAISON ASSESSMENT NOTE - NSBHCONSULTMEDAGITATION_PSY_A_CORE FT
zyprexa 2.5 mg IM q8h prn agitation with holding parameters of hold if BP <100/60 or HR <60l; hold if QTC >500 zyprexa 2.5 mg IM q8h prn agitation with holding parameters of hold if BP <100/60 or HR <60; hold if QTC >500
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
## 2024-09-13 NOTE — BH CONSULTATION LIAISON ASSESSMENT NOTE - RISK ASSESSMENT
She has risk due to progressive nature of her disease however mitigated by protective factors of familial and spouse support, adherence to medication, no history of suicidality, no history of psychiatric hospitalization.  This gives her an elevated chronic risk and low acute risk.

## 2024-09-13 NOTE — BH CONSULTATION LIAISON ASSESSMENT NOTE - SUMMARY
The patient is a 79 year old female with PMH of Alzheimer's disease, recurrent UTIs, intermittent urinary retention, fibromyalgia, hypothyroidism, CAD who presented to the emergency room for worsening behavioral disturbance at assisted living facility. The patient has psychiatric history of major neurocognitive disorder due to alzheimer's disease with behavioral disturbance with no past psychiatric hospitalizations. The patient was found to have retained 800 cc of urine in the emergency room and was subsequently admitted for further evaluation. She required zyprexa 5 mg IM q once in the ER due to agitated behavior. Her urinalysis returned with wbc 18 and small leukocyte esterase and she was initiated on ceftriaxone 1000 mg IV q24 hours for three days to treat UTI. Psychiatry was consulted for medication management. The patient's  endorses the patient has behavioral disturbances when she has a urinary tract infection. Throughout evaluation, the patient is minimally response however pleasant. She is AA0X1 (person, not place or time) which is her baseline. She has been behaviorally controlled overnight post receiving the intramuscular zyprexa. She denies depressive symptoms and no manic or psychotic symptoms appear throughout evaluation. She denies suicidal or homicidal ideation, intent, or plan. No perceptual disturbances elicited.     Plan:  C/w home medication regimen of vraylar 1.5 mg po daily, trazodone 50 mg po qhs, memantine ER 28 mg po daily, and rivastigmine 9.5 mg transdermal patch per 24 hours  C/w zyprexa 2.5 mg IM q8h prn agitation with holding parameters of hold if BP <100/60 or HR <60l; hold if QTC >500  Continue to monitor QTc   Initiate delirium measures such as clock visible in room, family members at bedside, window shades open during the day   Please call psychiatry should further questions arise  No psychiatric contraindications to discharge when patient medically stabilized  The patient is a 79 year old female with PMH of Alzheimer's disease, recurrent UTIs, intermittent urinary retention, fibromyalgia, hypothyroidism, CAD who presented to the emergency room for worsening behavioral disturbance at assisted living facility. The patient has psychiatric history of major neurocognitive disorder due to alzheimer's disease with behavioral disturbance with no past psychiatric hospitalizations. The patient was found to have retained 800 cc of urine in the emergency room and was subsequently admitted for further evaluation. She required zyprexa 5 mg IM q once in the ER due to agitated behavior. Her urinalysis returned with wbc 18 and small leukocyte esterase and she was initiated on ceftriaxone 1000 mg IV q24 hours for three days to treat UTI. Psychiatry was consulted for medication management. The patient's  endorses the patient has behavioral disturbances when she has a urinary tract infection. Throughout evaluation, the patient is minimally response however pleasant. She is AA0X1 (person, not place or time) which is her baseline. She has been behaviorally controlled overnight post receiving the intramuscular zyprexa. She denies depressive symptoms and no manic or psychotic symptoms appear throughout evaluation. She denies suicidal or homicidal ideation, intent, or plan. No perceptual disturbances elicited.     Plan:  C/w home medication regimen of vraylar 1.5 mg po daily, trazodone 50 mg po qhs, memantine ER 28 mg po daily, and rivastigmine 9.5 mg transdermal patch per 24 hours  C/w zyprexa 2.5 mg IM q8h prn agitation with holding parameters of hold if BP <100/60 or HR <60; hold if QTC >500  Continue to monitor QTc   Initiate delirium measures such as clock visible in room, family members at bedside, window shades open during the day   Please call psychiatry should further questions arise  No psychiatric contraindications to discharge when patient medically stabilized  The patient is a 79 year old female with PMH of Alzheimer's disease, recurrent UTIs, intermittent urinary retention, fibromyalgia, hypothyroidism, CAD who presented to the emergency room for worsening behavioral disturbance at assisted living facility. The patient has psychiatric history of major neurocognitive disorder due to alzheimer's disease with behavioral disturbance with no past psychiatric hospitalizations. The patient was found to have retained 800 cc of urine in the emergency room and was subsequently admitted for further evaluation. She required zyprexa 5 mg IM q once in the ER due to agitated behavior. Her urinalysis significant for wbc 18 and small leukocyte esterase. Pt was initiated on ceftriaxone 1000 mg IV q24 hours for three days to treat UTI. Psychiatry was consulted for medication management. The patient's  endorses the patient has behavioral disturbances when she has a urinary tract infection. Throughout evaluation, the patient is minimally response however pleasant. She is AA0X1 (person, not place or time) which is her baseline. She has been behaviorally controlled overnight post receiving the intramuscular Zyprexa. She denies depressive symptoms and no manic or psychotic symptoms appear throughout evaluation. She denies suicidal or homicidal ideation, intent, or plan. No perceptual disturbances elicited.     Plan:  C/w home medication regimen of Vraylar 1.5 mg po daily, trazodone 50 mg po qhs, memantine ER 28 mg po daily, and rivastigmine 9.5 mg transdermal patch per 24 hours  C/w Zyprexa 2.5 mg IM q8h prn agitation with holding parameters of hold if BP <100/60 or HR <60; hold if QTC >500  Continue to monitor QTc   Initiate delirium measures such as clock visible in room, family members at bedside, window shades open during the day   Please call psychiatry should further questions arise  No psychiatric contraindications to discharge when patient medically stabilized
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
## 2024-09-13 NOTE — H&P ADULT - CONVERSATION DETAILS
Patient sent in from NH with several documents including:   - HCP form indicating Leonel Richardson (spouse) as [phone] or if unavailable, Darin Coyle [phone]  - [facility] form indicating DNR/DNI status, signed by Leonel Richardson on 4/11/24 and also signed by Chuckie Padgett MD  - Will place DNR/DNI order

## 2024-09-13 NOTE — BH CONSULTATION LIAISON ASSESSMENT NOTE - NSBHATTESTCOMMENTATTENDFT_PSY_A_CORE
Pt is an 79 year old female with PMH of Alzheimer's disease, recurrent UTIs, intermittent urinary retention, fibromyalgia, hypothyroidism, CAD who presented to the emergency room for worsening behavioral disturbance at assisted living facility. Pt was found to have urinary retention and an UTI on admission, highly suggestive of delirium in context of underlying neurocognitive disorder.   Agree with medication recommendations as outlined in resident's note above.   Re-consult prn

## 2024-09-13 NOTE — PATIENT PROFILE ADULT - DO YOU EVER NEED HELP READING HOSPITAL MATERIALS?
Pt notified of test results and of  recommendations. Pt verbalized understanding regarding results and  will will  continue to watch Carbohydrates in his diet and  exercise.    no

## 2024-09-14 LAB
ANION GAP SERPL CALC-SCNC: 9 MMOL/L — SIGNIFICANT CHANGE UP (ref 5–17)
BASOPHILS # BLD AUTO: 0.03 K/UL — SIGNIFICANT CHANGE UP (ref 0–0.2)
BASOPHILS NFR BLD AUTO: 0.7 % — SIGNIFICANT CHANGE UP (ref 0–2)
BUN SERPL-MCNC: 15 MG/DL — SIGNIFICANT CHANGE UP (ref 7–23)
CALCIUM SERPL-MCNC: 8.8 MG/DL — SIGNIFICANT CHANGE UP (ref 8.4–10.5)
CHLORIDE SERPL-SCNC: 108 MMOL/L — SIGNIFICANT CHANGE UP (ref 96–108)
CO2 SERPL-SCNC: 24 MMOL/L — SIGNIFICANT CHANGE UP (ref 22–31)
CREAT SERPL-MCNC: 0.98 MG/DL — SIGNIFICANT CHANGE UP (ref 0.5–1.3)
CULTURE RESULTS: SIGNIFICANT CHANGE UP
EGFR: 58 ML/MIN/1.73M2 — LOW
EOSINOPHIL # BLD AUTO: 0.1 K/UL — SIGNIFICANT CHANGE UP (ref 0–0.5)
EOSINOPHIL NFR BLD AUTO: 2.3 % — SIGNIFICANT CHANGE UP (ref 0–6)
GLUCOSE SERPL-MCNC: 94 MG/DL — SIGNIFICANT CHANGE UP (ref 70–99)
HCT VFR BLD CALC: 34.4 % — LOW (ref 34.5–45)
HGB BLD-MCNC: 11.1 G/DL — LOW (ref 11.5–15.5)
IMM GRANULOCYTES NFR BLD AUTO: 0.2 % — SIGNIFICANT CHANGE UP (ref 0–0.9)
LYMPHOCYTES # BLD AUTO: 0.85 K/UL — LOW (ref 1–3.3)
LYMPHOCYTES # BLD AUTO: 19.6 % — SIGNIFICANT CHANGE UP (ref 13–44)
MAGNESIUM SERPL-MCNC: 2.3 MG/DL — SIGNIFICANT CHANGE UP (ref 1.6–2.6)
MCHC RBC-ENTMCNC: 29.1 PG — SIGNIFICANT CHANGE UP (ref 27–34)
MCHC RBC-ENTMCNC: 32.3 GM/DL — SIGNIFICANT CHANGE UP (ref 32–36)
MCV RBC AUTO: 90.1 FL — SIGNIFICANT CHANGE UP (ref 80–100)
MONOCYTES # BLD AUTO: 0.48 K/UL — SIGNIFICANT CHANGE UP (ref 0–0.9)
MONOCYTES NFR BLD AUTO: 11.1 % — SIGNIFICANT CHANGE UP (ref 2–14)
NEUTROPHILS # BLD AUTO: 2.87 K/UL — SIGNIFICANT CHANGE UP (ref 1.8–7.4)
NEUTROPHILS NFR BLD AUTO: 66.1 % — SIGNIFICANT CHANGE UP (ref 43–77)
NRBC # BLD: 0 /100 WBCS — SIGNIFICANT CHANGE UP (ref 0–0)
PHOSPHATE SERPL-MCNC: 3.8 MG/DL — SIGNIFICANT CHANGE UP (ref 2.5–4.5)
PLATELET # BLD AUTO: 343 K/UL — SIGNIFICANT CHANGE UP (ref 150–400)
POTASSIUM SERPL-MCNC: 3.6 MMOL/L — SIGNIFICANT CHANGE UP (ref 3.5–5.3)
POTASSIUM SERPL-SCNC: 3.6 MMOL/L — SIGNIFICANT CHANGE UP (ref 3.5–5.3)
RBC # BLD: 3.82 M/UL — SIGNIFICANT CHANGE UP (ref 3.8–5.2)
RBC # FLD: 14.2 % — SIGNIFICANT CHANGE UP (ref 10.3–14.5)
SODIUM SERPL-SCNC: 141 MMOL/L — SIGNIFICANT CHANGE UP (ref 135–145)
SPECIMEN SOURCE: SIGNIFICANT CHANGE UP
WBC # BLD: 4.34 K/UL — SIGNIFICANT CHANGE UP (ref 3.8–10.5)
WBC # FLD AUTO: 4.34 K/UL — SIGNIFICANT CHANGE UP (ref 3.8–10.5)

## 2024-09-14 PROCEDURE — 99232 SBSQ HOSP IP/OBS MODERATE 35: CPT

## 2024-09-14 PROCEDURE — 93010 ELECTROCARDIOGRAM REPORT: CPT

## 2024-09-14 RX ORDER — CEFPODOXIME PROXETIL 100 MG/5ML
1 GRANULE, FOR SUSPENSION ORAL
Qty: 10 | Refills: 0
Start: 2024-09-14 | End: 2024-09-18

## 2024-09-14 RX ORDER — POTASSIUM CHLORIDE 10 MEQ
10 TABLET, EXT RELEASE, PARTICLES/CRYSTALS ORAL
Refills: 0 | Status: COMPLETED | OUTPATIENT
Start: 2024-09-14 | End: 2024-09-14

## 2024-09-14 RX ADMIN — POLYETHYLENE GLYCOL 3350 17 GRAM(S): 17 POWDER, FOR SOLUTION ORAL at 10:21

## 2024-09-14 RX ADMIN — Medication 10 MILLIGRAM(S): at 12:49

## 2024-09-14 RX ADMIN — POLYETHYLENE GLYCOL 3350 17 GRAM(S): 17 POWDER, FOR SOLUTION ORAL at 20:41

## 2024-09-14 RX ADMIN — Medication 2 TABLET(S): at 20:42

## 2024-09-14 RX ADMIN — MEMANTINE 10 MILLIGRAM(S): 7 CAPSULE, EXTENDED RELEASE ORAL at 10:22

## 2024-09-14 RX ADMIN — Medication 100 MILLIEQUIVALENT(S): at 13:55

## 2024-09-14 RX ADMIN — Medication 100 MILLIEQUIVALENT(S): at 12:50

## 2024-09-14 RX ADMIN — Medication 2 TABLET(S): at 10:22

## 2024-09-14 RX ADMIN — MEMANTINE 10 MILLIGRAM(S): 7 CAPSULE, EXTENDED RELEASE ORAL at 22:43

## 2024-09-14 RX ADMIN — Medication 100 MILLIEQUIVALENT(S): at 10:22

## 2024-09-14 RX ADMIN — ENOXAPARIN SODIUM 40 MILLIGRAM(S): 100 INJECTION SUBCUTANEOUS at 12:46

## 2024-09-14 RX ADMIN — Medication 100 MILLIGRAM(S): at 10:22

## 2024-09-14 RX ADMIN — Medication 50 MILLIGRAM(S): at 20:42

## 2024-09-14 RX ADMIN — RIVASTIGMINE TARTRATE 1 PATCH: 1.5 CAPSULE ORAL at 20:40

## 2024-09-14 NOTE — PROGRESS NOTE ADULT - ASSESSMENT
79w dementia, recurrent utis adm after episode of combative behavior, ?2/2 another uti  -discharge was planned yesterday but pt's facility didn't accept the pt late on Friday afternoon, will be accepted on Monday  -continue ceftriaxone to finish a 5 day course of abx (?abx vs cx timing), , can change to po cefpodoxime at discharge, clinically doing well  -continue current regimen recommended by psychiatry  -on lovenox for dvt prophylaxis

## 2024-09-14 NOTE — PROGRESS NOTE ADULT - SUBJECTIVE AND OBJECTIVE BOX
no complaints, no abd pain, no dysuria  Vital Signs Last 24 Hrs  T(C): 36.9 (14 Sep 2024 13:02), Max: 37.2 (14 Sep 2024 06:03)  T(F): 98.4 (14 Sep 2024 13:02), Max: 98.9 (14 Sep 2024 06:03)  HR: 59 (14 Sep 2024 13:02) (56 - 64)  BP: 141/69 (14 Sep 2024 13:02) (112/58 - 141/69)  BP(mean): --  RR: 16 (14 Sep 2024 13:02) (16 - 18)  SpO2: 99% (14 Sep 2024 13:02) (96% - 99%)    Parameters below as of 14 Sep 2024 13:02  Patient On (Oxygen Delivery Method): room air    cv rrr, no m  lungs cta ant  abd soft, nt, nd  ext no edema                          11.1   4.34  )-----------( 343      ( 14 Sep 2024 07:47 )             34.4   09-14    141  |  108  |  15  ----------------------------<  94  3.6   |  24  |  0.98    Ca    8.8      14 Sep 2024 07:47  Phos  3.8     09-14  Mg     2.3     09-14      Culture - Urine (collected 12 Sep 2024 22:34)  Source: Clean Catch None  Final Report (14 Sep 2024 09:25):    <10,000 CFU/mL Normal Urogenital Maureen    Urinalysis with Rflx Culture (collected 12 Sep 2024 22:34)    MEDICATIONS  (STANDING):  bisacodyl Suppository 10 milliGRAM(s) Rectal daily  cefTRIAXone   IVPB 1000 milliGRAM(s) IV Intermittent every 24 hours  enoxaparin Injectable 40 milliGRAM(s) SubCutaneous every 24 hours  memantine 10 milliGRAM(s) Oral every 12 hours  polyethylene glycol 3350 17 Gram(s) Oral every 12 hours  rivastigmine patch  9.5 mG/24 Hr(s) 1 Patch Transdermal every 24 hours  senna 2 Tablet(s) Oral two times a day  traZODone 50 milliGRAM(s) Oral at bedtime    MEDICATIONS  (PRN):  acetaminophen     Tablet .. 650 milliGRAM(s) Oral every 6 hours PRN Temp greater or equal to 38C (100.4F), Mild Pain (1 - 3)  melatonin 3 milliGRAM(s) Oral at bedtime PRN Insomnia  OLANZapine Injectable 2.5 milliGRAM(s) IntraMuscular every 8 hours PRN Agitation  ondansetron Injectable 4 milliGRAM(s) IV Push every 8 hours PRN Nausea and/or Vomiting

## 2024-09-15 LAB
ANION GAP SERPL CALC-SCNC: 7 MMOL/L — SIGNIFICANT CHANGE UP (ref 5–17)
BASOPHILS # BLD AUTO: 0.01 K/UL — SIGNIFICANT CHANGE UP (ref 0–0.2)
BASOPHILS NFR BLD AUTO: 0.2 % — SIGNIFICANT CHANGE UP (ref 0–2)
BUN SERPL-MCNC: 13 MG/DL — SIGNIFICANT CHANGE UP (ref 7–23)
CALCIUM SERPL-MCNC: 9 MG/DL — SIGNIFICANT CHANGE UP (ref 8.4–10.5)
CHLORIDE SERPL-SCNC: 107 MMOL/L — SIGNIFICANT CHANGE UP (ref 96–108)
CO2 SERPL-SCNC: 25 MMOL/L — SIGNIFICANT CHANGE UP (ref 22–31)
CREAT SERPL-MCNC: 0.96 MG/DL — SIGNIFICANT CHANGE UP (ref 0.5–1.3)
EGFR: 60 ML/MIN/1.73M2 — SIGNIFICANT CHANGE UP
EOSINOPHIL # BLD AUTO: 0.22 K/UL — SIGNIFICANT CHANGE UP (ref 0–0.5)
EOSINOPHIL NFR BLD AUTO: 5 % — SIGNIFICANT CHANGE UP (ref 0–6)
GLUCOSE SERPL-MCNC: 97 MG/DL — SIGNIFICANT CHANGE UP (ref 70–99)
HCT VFR BLD CALC: 36 % — SIGNIFICANT CHANGE UP (ref 34.5–45)
HGB BLD-MCNC: 11.7 G/DL — SIGNIFICANT CHANGE UP (ref 11.5–15.5)
IMM GRANULOCYTES NFR BLD AUTO: 0.5 % — SIGNIFICANT CHANGE UP (ref 0–0.9)
LYMPHOCYTES # BLD AUTO: 0.79 K/UL — LOW (ref 1–3.3)
LYMPHOCYTES # BLD AUTO: 17.8 % — SIGNIFICANT CHANGE UP (ref 13–44)
MAGNESIUM SERPL-MCNC: 2.4 MG/DL — SIGNIFICANT CHANGE UP (ref 1.6–2.6)
MCHC RBC-ENTMCNC: 30 PG — SIGNIFICANT CHANGE UP (ref 27–34)
MCHC RBC-ENTMCNC: 32.5 GM/DL — SIGNIFICANT CHANGE UP (ref 32–36)
MCV RBC AUTO: 92.3 FL — SIGNIFICANT CHANGE UP (ref 80–100)
MONOCYTES # BLD AUTO: 0.5 K/UL — SIGNIFICANT CHANGE UP (ref 0–0.9)
MONOCYTES NFR BLD AUTO: 11.3 % — SIGNIFICANT CHANGE UP (ref 2–14)
NEUTROPHILS # BLD AUTO: 2.89 K/UL — SIGNIFICANT CHANGE UP (ref 1.8–7.4)
NEUTROPHILS NFR BLD AUTO: 65.2 % — SIGNIFICANT CHANGE UP (ref 43–77)
NRBC # BLD: 0 /100 WBCS — SIGNIFICANT CHANGE UP (ref 0–0)
PHOSPHATE SERPL-MCNC: 3.9 MG/DL — SIGNIFICANT CHANGE UP (ref 2.5–4.5)
PLATELET # BLD AUTO: 326 K/UL — SIGNIFICANT CHANGE UP (ref 150–400)
POTASSIUM SERPL-MCNC: 3.9 MMOL/L — SIGNIFICANT CHANGE UP (ref 3.5–5.3)
POTASSIUM SERPL-SCNC: 3.9 MMOL/L — SIGNIFICANT CHANGE UP (ref 3.5–5.3)
RBC # BLD: 3.9 M/UL — SIGNIFICANT CHANGE UP (ref 3.8–5.2)
RBC # FLD: 14 % — SIGNIFICANT CHANGE UP (ref 10.3–14.5)
SODIUM SERPL-SCNC: 139 MMOL/L — SIGNIFICANT CHANGE UP (ref 135–145)
WBC # BLD: 4.43 K/UL — SIGNIFICANT CHANGE UP (ref 3.8–10.5)
WBC # FLD AUTO: 4.43 K/UL — SIGNIFICANT CHANGE UP (ref 3.8–10.5)

## 2024-09-15 RX ADMIN — MEMANTINE 10 MILLIGRAM(S): 7 CAPSULE, EXTENDED RELEASE ORAL at 11:54

## 2024-09-15 RX ADMIN — RIVASTIGMINE TARTRATE 1 PATCH: 1.5 CAPSULE ORAL at 19:29

## 2024-09-15 RX ADMIN — ENOXAPARIN SODIUM 40 MILLIGRAM(S): 100 INJECTION SUBCUTANEOUS at 11:54

## 2024-09-15 RX ADMIN — RIVASTIGMINE TARTRATE 1 PATCH: 1.5 CAPSULE ORAL at 08:29

## 2024-09-15 RX ADMIN — RIVASTIGMINE TARTRATE 1 PATCH: 1.5 CAPSULE ORAL at 18:09

## 2024-09-15 RX ADMIN — RIVASTIGMINE TARTRATE 1 PATCH: 1.5 CAPSULE ORAL at 19:10

## 2024-09-15 RX ADMIN — Medication 100 MILLIGRAM(S): at 09:23
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
## 2024-09-15 NOTE — PROGRESS NOTE ADULT - SUBJECTIVE AND OBJECTIVE BOX
INTERVAL/OVERNIGHT EVENTS: None    SUBJECTIVE:  Patient seen and examined at bedside, comfortable, NAD. Denied fever, chest pain, dyspnea, abdominal pain.     Vital Signs Last 12 Hrs  T(F): 98.3 (09-15-24 @ 12:00), Max: 98.3 (09-15-24 @ 12:00)  HR: 63 (09-15-24 @ 12:00) (57 - 63)  BP: 100/64 (09-15-24 @ 12:00) (100/64 - 133/64)  BP(mean): 87 (09-15-24 @ 06:22) (87 - 87)  RR: 17 (09-15-24 @ 12:00) (17 - 18)  SpO2: 97% (09-15-24 @ 12:00) (97% - 97%)  I&O's Summary    14 Sep 2024 07:01  -  15 Sep 2024 07:00  --------------------------------------------------------  IN: 0 mL / OUT: 2330 mL / NET: -2330 mL        PHYSICAL EXAM:  General: NAD  HEENT: PERRL, EOM intact, sclera anicteric, MMM  Cardiovascular: RRR; no MRG; no JVD  Respiratory: CTAB; no WRR  GI/: soft; NTND; BS x4  Extremities: WWP; 2+ peripheral pulses bilaterally; no LE edema  Skin: normal color & turgor; no rash  Neurologic: aox1; no focal deficits    LABS:                        11.7   4.43  )-----------( 326      ( 15 Sep 2024 05:30 )             36.0     09-15    139  |  107  |  13  ----------------------------<  97  3.9   |  25  |  0.96    Ca    9.0      15 Sep 2024 05:30  Phos  3.9     09-15  Mg     2.4     09-15        Urinalysis Basic - ( 15 Sep 2024 05:30 )    Color: x / Appearance: x / SG: x / pH: x  Gluc: 97 mg/dL / Ketone: x  / Bili: x / Urobili: x   Blood: x / Protein: x / Nitrite: x   Leuk Esterase: x / RBC: x / WBC x   Sq Epi: x / Non Sq Epi: x / Bacteria: x          RADIOLOGY & ADDITIONAL TESTS:    MEDICATIONS  (STANDING):  bisacodyl Suppository 10 milliGRAM(s) Rectal daily  enoxaparin Injectable 40 milliGRAM(s) SubCutaneous every 24 hours  memantine 10 milliGRAM(s) Oral every 12 hours  polyethylene glycol 3350 17 Gram(s) Oral every 12 hours  rivastigmine patch  9.5 mG/24 Hr(s) 1 Patch Transdermal every 24 hours  senna 2 Tablet(s) Oral two times a day  traZODone 50 milliGRAM(s) Oral at bedtime    MEDICATIONS  (PRN):  acetaminophen     Tablet .. 650 milliGRAM(s) Oral every 6 hours PRN Temp greater or equal to 38C (100.4F), Mild Pain (1 - 3)  melatonin 3 milliGRAM(s) Oral at bedtime PRN Insomnia  OLANZapine Injectable 2.5 milliGRAM(s) IntraMuscular every 8 hours PRN Agitation  ondansetron Injectable 4 milliGRAM(s) IV Push every 8 hours PRN Nausea and/or Vomiting

## 2024-09-16 PROCEDURE — 99239 HOSP IP/OBS DSCHRG MGMT >30: CPT | Mod: GC

## 2024-09-16 RX ORDER — MEMANTINE 7 MG/1
1 CAPSULE, EXTENDED RELEASE ORAL
Refills: 0 | DISCHARGE

## 2024-09-16 RX ORDER — ACETAMINOPHEN 325 MG/1
2 TABLET ORAL
Qty: 240 | Refills: 0
Start: 2024-09-16 | End: 2024-10-15

## 2024-09-16 RX ORDER — TRAZODONE HCL 50 MG
1 TABLET ORAL
Qty: 0 | Refills: 0 | DISCHARGE

## 2024-09-16 RX ORDER — POLYETHYLENE GLYCOL 3350 17 G/17G
17 POWDER, FOR SOLUTION ORAL
Qty: 2 | Refills: 0
Start: 2024-09-16 | End: 2024-10-15

## 2024-09-16 RX ORDER — SENNA 187 MG
2 TABLET ORAL
Qty: 120 | Refills: 0
Start: 2024-09-16 | End: 2024-10-15

## 2024-09-16 RX ADMIN — RIVASTIGMINE TARTRATE 1 PATCH: 1.5 CAPSULE ORAL at 22:27

## 2024-09-16 RX ADMIN — ENOXAPARIN SODIUM 40 MILLIGRAM(S): 100 INJECTION SUBCUTANEOUS at 11:57

## 2024-09-16 RX ADMIN — MEMANTINE 10 MILLIGRAM(S): 7 CAPSULE, EXTENDED RELEASE ORAL at 11:57

## 2024-09-16 RX ADMIN — Medication 2 TABLET(S): at 22:09

## 2024-09-16 RX ADMIN — RIVASTIGMINE TARTRATE 1 PATCH: 1.5 CAPSULE ORAL at 18:25

## 2024-09-16 RX ADMIN — POLYETHYLENE GLYCOL 3350 17 GRAM(S): 17 POWDER, FOR SOLUTION ORAL at 22:09

## 2024-09-16 RX ADMIN — OLANZAPINE 2.5 MILLIGRAM(S): 7.5 TABLET ORAL at 08:45

## 2024-09-16 RX ADMIN — RIVASTIGMINE TARTRATE 1 PATCH: 1.5 CAPSULE ORAL at 18:24

## 2024-09-16 RX ADMIN — Medication 50 MILLIGRAM(S): at 22:09

## 2024-09-16 RX ADMIN — RIVASTIGMINE TARTRATE 1 PATCH: 1.5 CAPSULE ORAL at 06:17

## 2024-09-16 NOTE — PROGRESS NOTE ADULT - PROBLEM SELECTOR PLAN 2
AAOx1 at baseline  - c/w home memantine 10mg po daily  - see above AAOx1 at baseline  - dc home memantine as can cause bradycardia

## 2024-09-16 NOTE — PROGRESS NOTE ADULT - PROBLEM SELECTOR PLAN 5
Per chart review, pt w hx of hypothyroidism, but does not appear to currently be prescribed any meds  - f/u TSH, free T4  - formal med rec
Per chart review, pt w hx of hypothyroidism, but does not appear to currently be prescribed any meds  - f/u TSH, free T4  - formal med rec
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
## 2024-09-16 NOTE — PROGRESS NOTE ADULT - SUBJECTIVE AND OBJECTIVE BOX
**INCOMPLETE NOTE    INTERVAL/OVERNIGHT EVENTS: None    SUBJECTIVE:  Patient seen and examined at bedside, comfortable, NAD. Denied fever, chest pain, dyspnea, abdominal pain.     Vital Signs Last 12 Hrs  T(F): 97.7 (09-16-24 @ 06:10), Max: 97.7 (09-16-24 @ 06:10)  HR: 60 (09-16-24 @ 06:10) (51 - 60)  BP: 126/65 (09-16-24 @ 06:10) (119/59 - 126/65)  BP(mean): 85 (09-16-24 @ 06:10) (85 - 85)  RR: 19 (09-16-24 @ 06:10) (18 - 19)  SpO2: 99% (09-16-24 @ 06:10) (97% - 99%)  I&O's Summary      PHYSICAL EXAM:  General: NAD  HEENT: PERRL, EOM intact, sclera anicteric, MMM  Cardiovascular: RRR; no MRG; no JVD  Respiratory: CTAB; no WRR  GI/: soft; NTND; BS x4  Extremities: WWP; 2+ peripheral pulses bilaterally; no LE edema  Skin: normal color & turgor; no rash  Neurologic: aox3; no focal deficits    LABS:                        11.7   4.43  )-----------( 326      ( 15 Sep 2024 05:30 )             36.0     09-15    139  |  107  |  13  ----------------------------<  97  3.9   |  25  |  0.96    Ca    9.0      15 Sep 2024 05:30  Phos  3.9     09-15  Mg     2.4     09-15        Urinalysis Basic - ( 15 Sep 2024 05:30 )    Color: x / Appearance: x / SG: x / pH: x  Gluc: 97 mg/dL / Ketone: x  / Bili: x / Urobili: x   Blood: x / Protein: x / Nitrite: x   Leuk Esterase: x / RBC: x / WBC x   Sq Epi: x / Non Sq Epi: x / Bacteria: x          RADIOLOGY & ADDITIONAL TESTS:    MEDICATIONS  (STANDING):  bisacodyl Suppository 10 milliGRAM(s) Rectal daily  enoxaparin Injectable 40 milliGRAM(s) SubCutaneous every 24 hours  memantine 10 milliGRAM(s) Oral every 12 hours  polyethylene glycol 3350 17 Gram(s) Oral every 12 hours  rivastigmine patch  9.5 mG/24 Hr(s) 1 Patch Transdermal every 24 hours  senna 2 Tablet(s) Oral two times a day  traZODone 50 milliGRAM(s) Oral at bedtime    MEDICATIONS  (PRN):  acetaminophen     Tablet .. 650 milliGRAM(s) Oral every 6 hours PRN Temp greater or equal to 38C (100.4F), Mild Pain (1 - 3)  melatonin 3 milliGRAM(s) Oral at bedtime PRN Insomnia  OLANZapine Injectable 2.5 milliGRAM(s) IntraMuscular every 8 hours PRN Agitation  ondansetron Injectable 4 milliGRAM(s) IV Push every 8 hours PRN Nausea and/or Vomiting   INTERVAL/OVERNIGHT EVENTS: Sinus veronique while sleeping to 41, asymptomatic     SUBJECTIVE:  Patient seen and examined at bedside, denying any acute complaints, displaying self mutilating behavior. Denied fever, chest pain, dyspnea, abdominal pain.     Vital Signs Last 12 Hrs  T(F): 97.7 (09-16-24 @ 06:10), Max: 97.7 (09-16-24 @ 06:10)  HR: 60 (09-16-24 @ 06:10) (51 - 60)  BP: 126/65 (09-16-24 @ 06:10) (119/59 - 126/65)  BP(mean): 85 (09-16-24 @ 06:10) (85 - 85)  RR: 19 (09-16-24 @ 06:10) (18 - 19)  SpO2: 99% (09-16-24 @ 06:10) (97% - 99%)  I&O's Summary      PHYSICAL EXAM:  General: NAD  HEENT: PERRL, EOM intact, sclera anicteric, MMM  Cardiovascular: RRR; no MRG; no JVD  Respiratory: CTAB; no WRR  GI/: soft; NTND; BS x4  Extremities: WWP; 2+ peripheral pulses bilaterally; no LE edema  Skin: normal color & turgor; no rash  Neurologic: aox3; no focal deficits; self-mutilating behavior    LABS:                        11.7   4.43  )-----------( 326      ( 15 Sep 2024 05:30 )             36.0     09-15    139  |  107  |  13  ----------------------------<  97  3.9   |  25  |  0.96    Ca    9.0      15 Sep 2024 05:30  Phos  3.9     09-15  Mg     2.4     09-15        Urinalysis Basic - ( 15 Sep 2024 05:30 )    Color: x / Appearance: x / SG: x / pH: x  Gluc: 97 mg/dL / Ketone: x  / Bili: x / Urobili: x   Blood: x / Protein: x / Nitrite: x   Leuk Esterase: x / RBC: x / WBC x   Sq Epi: x / Non Sq Epi: x / Bacteria: x          RADIOLOGY & ADDITIONAL TESTS:    MEDICATIONS  (STANDING):  bisacodyl Suppository 10 milliGRAM(s) Rectal daily  enoxaparin Injectable 40 milliGRAM(s) SubCutaneous every 24 hours  memantine 10 milliGRAM(s) Oral every 12 hours  polyethylene glycol 3350 17 Gram(s) Oral every 12 hours  rivastigmine patch  9.5 mG/24 Hr(s) 1 Patch Transdermal every 24 hours  senna 2 Tablet(s) Oral two times a day  traZODone 50 milliGRAM(s) Oral at bedtime    MEDICATIONS  (PRN):  acetaminophen     Tablet .. 650 milliGRAM(s) Oral every 6 hours PRN Temp greater or equal to 38C (100.4F), Mild Pain (1 - 3)  melatonin 3 milliGRAM(s) Oral at bedtime PRN Insomnia  OLANZapine Injectable 2.5 milliGRAM(s) IntraMuscular every 8 hours PRN Agitation  ondansetron Injectable 4 milliGRAM(s) IV Push every 8 hours PRN Nausea and/or Vomiting

## 2024-09-16 NOTE — PROGRESS NOTE ADULT - ATTENDING COMMENTS
dc to assisted   Noted to have sinus bradycardia overnight -- will dc Memantine on dc -   Physical exam:  GENERAL: NAD, lying in bed comfortably  HEAD:  Atraumatic, Normocephalic  EYES: EOMI, PERRLA, conjunctiva and sclera clear  ENT: dry mucous membranes  NECK: Supple, No JVD  CHEST/LUNG: Clear to auscultation bilaterally; No rales, rhonchi, wheezing, or rubs.  HEART: Regular rate and rhythm; S1+ S2+   ABDOMEN: Bowel sounds present; Soft, Nontender, Nondistended   EXTREMITIES:  2+ Peripheral Pulses, brisk capillary refill. No clubbing, cyanosis, or edema  SKIN: slight erythema on cheeks
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
## 2024-09-16 NOTE — PROGRESS NOTE ADULT - PROBLEM SELECTOR PLAN 3
Pt with hx of urinary retention on recent admission to [place] in May 2024, urinary retention 2/2 constipation. Noted to be retaining in ED > straight cath 800cc x 1.  - bladder scans q6  - miralax bid, senna bid, dulcolax suppository daily
Pt with hx of urinary retention on recent admission to [place] in May 2024, urinary retention 2/2 constipation. Noted to be retaining in ED > straight cath 800cc x 1.  - bladder scans q6  - miralax bid, senna bid, dulcolax suppository daily

## 2024-09-17 VITALS
OXYGEN SATURATION: 97 % | HEART RATE: 60 BPM | RESPIRATION RATE: 18 BRPM | SYSTOLIC BLOOD PRESSURE: 122 MMHG | TEMPERATURE: 98 F | DIASTOLIC BLOOD PRESSURE: 80 MMHG

## 2024-09-17 PROCEDURE — 99239 HOSP IP/OBS DSCHRG MGMT >30: CPT

## 2024-09-17 RX ORDER — POLYETHYLENE GLYCOL 3350 17 G/17G
17 POWDER, FOR SOLUTION ORAL
Qty: 1 | Refills: 0
Start: 2024-09-17 | End: 2024-10-16

## 2024-09-17 RX ORDER — OLANZAPINE 7.5 MG/1
1 TABLET ORAL
Qty: 90 | Refills: 0
Start: 2024-09-17 | End: 2024-10-16

## 2024-09-17 RX ORDER — ACETAMINOPHEN 325 MG/1
2 TABLET ORAL
Qty: 180 | Refills: 0
Start: 2024-09-17 | End: 2024-10-16

## 2024-09-17 RX ADMIN — Medication 2 TABLET(S): at 09:44

## 2024-09-17 RX ADMIN — POLYETHYLENE GLYCOL 3350 17 GRAM(S): 17 POWDER, FOR SOLUTION ORAL at 09:44

## 2024-09-17 RX ADMIN — ENOXAPARIN SODIUM 40 MILLIGRAM(S): 100 INJECTION SUBCUTANEOUS at 11:42

## 2024-09-17 NOTE — DISCHARGE NOTE NURSING/CASE MANAGEMENT/SOCIAL WORK - PATIENT PORTAL LINK FT
You can access the FollowMyHealth Patient Portal offered by [facility] by registering at the following website: [email or web address]. By joining [facility]’s FollowMyHealth portal, you will also be able to view your health information using other applications (apps) compatible with our system.

## 2024-09-17 NOTE — DISCHARGE NOTE NURSING/CASE MANAGEMENT/SOCIAL WORK - NSDCPEFALRISK_GEN_ALL_CORE
For information on Fall & Injury Prevention, visit: [email or web address] OR  [email or web address] OR  https://www.cdc.gov/steadi/patient.html

## 2024-09-20 DIAGNOSIS — M79.7 FIBROMYALGIA: ICD-10-CM

## 2024-09-20 DIAGNOSIS — Z66 DO NOT RESUSCITATE: ICD-10-CM

## 2024-09-20 DIAGNOSIS — Z87.440 PERSONAL HISTORY OF URINARY (TRACT) INFECTIONS: ICD-10-CM

## 2024-09-20 DIAGNOSIS — Z11.52 ENCOUNTER FOR SCREENING FOR COVID-19: ICD-10-CM

## 2024-09-20 DIAGNOSIS — G93.41 METABOLIC ENCEPHALOPATHY: ICD-10-CM

## 2024-09-20 DIAGNOSIS — F02.811 DEMENTIA IN OTHER DISEASES CLASSIFIED ELSEWHERE, UNSPECIFIED SEVERITY, WITH AGITATION: ICD-10-CM

## 2024-09-20 DIAGNOSIS — R33.9 RETENTION OF URINE, UNSPECIFIED: ICD-10-CM

## 2024-09-20 DIAGNOSIS — G30.9 ALZHEIMER'S DISEASE, UNSPECIFIED: ICD-10-CM

## 2024-09-20 DIAGNOSIS — N39.0 URINARY TRACT INFECTION, SITE NOT SPECIFIED: ICD-10-CM

## 2024-09-20 DIAGNOSIS — I25.10 ATHEROSCLEROTIC HEART DISEASE OF NATIVE CORONARY ARTERY WITHOUT ANGINA PECTORIS: ICD-10-CM

## 2024-09-20 DIAGNOSIS — E03.9 HYPOTHYROIDISM, UNSPECIFIED: ICD-10-CM

## 2024-09-20 DIAGNOSIS — R29.6 REPEATED FALLS: ICD-10-CM

## 2024-09-20 DIAGNOSIS — E78.5 HYPERLIPIDEMIA, UNSPECIFIED: ICD-10-CM

## 2024-09-20 DIAGNOSIS — M54.30 SCIATICA, UNSPECIFIED SIDE: ICD-10-CM

## 2024-09-21 LAB — T4 SERPL-MCNC: 8.7 UG/DL — SIGNIFICANT CHANGE UP

## 2024-09-29 PROCEDURE — 36415 COLL VENOUS BLD VENIPUNCTURE: CPT

## 2024-09-29 PROCEDURE — 96372 THER/PROPH/DIAG INJ SC/IM: CPT

## 2024-09-29 PROCEDURE — 85027 COMPLETE CBC AUTOMATED: CPT

## 2024-09-29 PROCEDURE — 84100 ASSAY OF PHOSPHORUS: CPT

## 2024-09-29 PROCEDURE — 84443 ASSAY THYROID STIM HORMONE: CPT

## 2024-09-29 PROCEDURE — 86780 TREPONEMA PALLIDUM: CPT

## 2024-09-29 PROCEDURE — 93005 ELECTROCARDIOGRAM TRACING: CPT

## 2024-09-29 PROCEDURE — 82746 ASSAY OF FOLIC ACID SERUM: CPT

## 2024-09-29 PROCEDURE — 87086 URINE CULTURE/COLONY COUNT: CPT

## 2024-09-29 PROCEDURE — 99285 EMERGENCY DEPT VISIT HI MDM: CPT | Mod: 25

## 2024-09-29 PROCEDURE — 84436 ASSAY OF TOTAL THYROXINE: CPT

## 2024-09-29 PROCEDURE — 70450 CT HEAD/BRAIN W/O DYE: CPT | Mod: MC

## 2024-09-29 PROCEDURE — 82962 GLUCOSE BLOOD TEST: CPT

## 2024-09-29 PROCEDURE — 83735 ASSAY OF MAGNESIUM: CPT

## 2024-09-29 PROCEDURE — 80048 BASIC METABOLIC PNL TOTAL CA: CPT

## 2024-09-29 PROCEDURE — 0225U NFCT DS DNA&RNA 21 SARSCOV2: CPT

## 2024-09-29 PROCEDURE — 82607 VITAMIN B-12: CPT

## 2024-09-29 PROCEDURE — 71045 X-RAY EXAM CHEST 1 VIEW: CPT

## 2024-09-29 PROCEDURE — 85025 COMPLETE CBC W/AUTO DIFF WBC: CPT

## 2024-09-29 PROCEDURE — 81001 URINALYSIS AUTO W/SCOPE: CPT
